# Patient Record
Sex: FEMALE | Race: OTHER | HISPANIC OR LATINO | URBAN - METROPOLITAN AREA
[De-identification: names, ages, dates, MRNs, and addresses within clinical notes are randomized per-mention and may not be internally consistent; named-entity substitution may affect disease eponyms.]

---

## 2018-01-27 ENCOUNTER — EMERGENCY (EMERGENCY)
Facility: HOSPITAL | Age: 83
LOS: 1 days | Discharge: HOME | End: 2018-01-27
Attending: EMERGENCY MEDICINE | Admitting: EMERGENCY MEDICINE
Payer: MEDICARE

## 2018-01-27 VITALS
HEART RATE: 65 BPM | DIASTOLIC BLOOD PRESSURE: 77 MMHG | SYSTOLIC BLOOD PRESSURE: 144 MMHG | TEMPERATURE: 98 F | OXYGEN SATURATION: 95 % | RESPIRATION RATE: 16 BRPM

## 2018-01-27 VITALS
SYSTOLIC BLOOD PRESSURE: 158 MMHG | HEART RATE: 60 BPM | DIASTOLIC BLOOD PRESSURE: 70 MMHG | RESPIRATION RATE: 17 BRPM | TEMPERATURE: 98 F | OXYGEN SATURATION: 97 %

## 2018-01-27 DIAGNOSIS — Z88.8 ALLERGY STATUS TO OTHER DRUGS, MEDICAMENTS AND BIOLOGICAL SUBSTANCES STATUS: ICD-10-CM

## 2018-01-27 DIAGNOSIS — M54.2 CERVICALGIA: ICD-10-CM

## 2018-01-27 DIAGNOSIS — Z88.1 ALLERGY STATUS TO OTHER ANTIBIOTIC AGENTS STATUS: ICD-10-CM

## 2018-01-27 DIAGNOSIS — Z79.2 LONG TERM (CURRENT) USE OF ANTIBIOTICS: ICD-10-CM

## 2018-01-27 DIAGNOSIS — I10 ESSENTIAL (PRIMARY) HYPERTENSION: ICD-10-CM

## 2018-01-27 DIAGNOSIS — E78.2 MIXED HYPERLIPIDEMIA: ICD-10-CM

## 2018-01-27 DIAGNOSIS — Z79.899 OTHER LONG TERM (CURRENT) DRUG THERAPY: ICD-10-CM

## 2018-01-27 DIAGNOSIS — R42 DIZZINESS AND GIDDINESS: ICD-10-CM

## 2018-01-27 DIAGNOSIS — E11.9 TYPE 2 DIABETES MELLITUS WITHOUT COMPLICATIONS: ICD-10-CM

## 2018-01-27 LAB
ALBUMIN SERPL ELPH-MCNC: 3.7 G/DL — SIGNIFICANT CHANGE UP (ref 3.3–5)
ALP SERPL-CCNC: 44 U/L — SIGNIFICANT CHANGE UP (ref 40–120)
ALT FLD-CCNC: 12 U/L — SIGNIFICANT CHANGE UP (ref 10–45)
ANION GAP SERPL CALC-SCNC: 11 MMOL/L — SIGNIFICANT CHANGE UP (ref 5–17)
APTT BLD: 31.7 SEC — SIGNIFICANT CHANGE UP (ref 27.5–37.4)
AST SERPL-CCNC: 18 U/L — SIGNIFICANT CHANGE UP (ref 10–40)
BASOPHILS NFR BLD AUTO: 0.5 % — SIGNIFICANT CHANGE UP (ref 0–2)
BILIRUB SERPL-MCNC: 0.4 MG/DL — SIGNIFICANT CHANGE UP (ref 0.2–1.2)
BUN SERPL-MCNC: 22 MG/DL — SIGNIFICANT CHANGE UP (ref 7–23)
CALCIUM SERPL-MCNC: 9.7 MG/DL — SIGNIFICANT CHANGE UP (ref 8.4–10.5)
CHLORIDE SERPL-SCNC: 100 MMOL/L — SIGNIFICANT CHANGE UP (ref 96–108)
CK MB CFR SERPL CALC: 2.6 NG/ML — SIGNIFICANT CHANGE UP (ref 0–6.7)
CK SERPL-CCNC: 52 U/L — SIGNIFICANT CHANGE UP (ref 25–170)
CO2 SERPL-SCNC: 26 MMOL/L — SIGNIFICANT CHANGE UP (ref 22–31)
CREAT SERPL-MCNC: 1 MG/DL — SIGNIFICANT CHANGE UP (ref 0.5–1.3)
EOSINOPHIL NFR BLD AUTO: 2.2 % — SIGNIFICANT CHANGE UP (ref 0–6)
GLUCOSE SERPL-MCNC: 104 MG/DL — HIGH (ref 70–99)
HCT VFR BLD CALC: 34.2 % — LOW (ref 34.5–45)
HGB BLD-MCNC: 11.2 G/DL — LOW (ref 11.5–15.5)
INR BLD: 1.17 — HIGH (ref 0.88–1.16)
LYMPHOCYTES # BLD AUTO: 17.9 % — SIGNIFICANT CHANGE UP (ref 13–44)
MCHC RBC-ENTMCNC: 28.1 PG — SIGNIFICANT CHANGE UP (ref 27–34)
MCHC RBC-ENTMCNC: 32.7 G/DL — SIGNIFICANT CHANGE UP (ref 32–36)
MCV RBC AUTO: 85.7 FL — SIGNIFICANT CHANGE UP (ref 80–100)
MONOCYTES NFR BLD AUTO: 9 % — SIGNIFICANT CHANGE UP (ref 2–14)
NEUTROPHILS NFR BLD AUTO: 70.4 % — SIGNIFICANT CHANGE UP (ref 43–77)
PLATELET # BLD AUTO: 207 K/UL — SIGNIFICANT CHANGE UP (ref 150–400)
POTASSIUM SERPL-MCNC: 4.2 MMOL/L — SIGNIFICANT CHANGE UP (ref 3.5–5.3)
POTASSIUM SERPL-SCNC: 4.2 MMOL/L — SIGNIFICANT CHANGE UP (ref 3.5–5.3)
PROT SERPL-MCNC: 6.5 G/DL — SIGNIFICANT CHANGE UP (ref 6–8.3)
PROTHROM AB SERPL-ACNC: 13 SEC — HIGH (ref 9.8–12.7)
RBC # BLD: 3.99 M/UL — SIGNIFICANT CHANGE UP (ref 3.8–5.2)
RBC # FLD: 14 % — SIGNIFICANT CHANGE UP (ref 10.3–16.9)
SODIUM SERPL-SCNC: 137 MMOL/L — SIGNIFICANT CHANGE UP (ref 135–145)
TROPONIN T SERPL-MCNC: <0.01 NG/ML — SIGNIFICANT CHANGE UP (ref 0–0.01)
WBC # BLD: 5.9 K/UL — SIGNIFICANT CHANGE UP (ref 3.8–10.5)
WBC # FLD AUTO: 5.9 K/UL — SIGNIFICANT CHANGE UP (ref 3.8–10.5)

## 2018-01-27 PROCEDURE — 72125 CT NECK SPINE W/O DYE: CPT | Mod: 26

## 2018-01-27 PROCEDURE — 99285 EMERGENCY DEPT VISIT HI MDM: CPT | Mod: 25

## 2018-01-27 PROCEDURE — 93010 ELECTROCARDIOGRAM REPORT: CPT

## 2018-01-27 PROCEDURE — 70450 CT HEAD/BRAIN W/O DYE: CPT | Mod: 26

## 2018-01-27 RX ORDER — ACETAMINOPHEN WITH CODEINE 300MG-30MG
1 TABLET ORAL
Qty: 18 | Refills: 0
Start: 2018-01-27

## 2018-01-27 RX ORDER — CYCLOBENZAPRINE HYDROCHLORIDE 10 MG/1
5 TABLET, FILM COATED ORAL ONCE
Refills: 0 | Status: COMPLETED | OUTPATIENT
Start: 2018-01-27 | End: 2018-01-27

## 2018-01-27 RX ORDER — CYCLOBENZAPRINE HYDROCHLORIDE 10 MG/1
1 TABLET, FILM COATED ORAL
Qty: 12 | Refills: 0
Start: 2018-01-27

## 2018-01-27 RX ORDER — ACETAMINOPHEN WITH CODEINE 300MG-30MG
1 TABLET ORAL ONCE
Refills: 0 | Status: DISCONTINUED | OUTPATIENT
Start: 2018-01-27 | End: 2018-01-27

## 2018-01-27 RX ORDER — SODIUM CHLORIDE 9 MG/ML
3 INJECTION INTRAMUSCULAR; INTRAVENOUS; SUBCUTANEOUS ONCE
Refills: 0 | Status: COMPLETED | OUTPATIENT
Start: 2018-01-27 | End: 2018-01-27

## 2018-01-27 RX ORDER — SODIUM CHLORIDE 9 MG/ML
1000 INJECTION INTRAMUSCULAR; INTRAVENOUS; SUBCUTANEOUS ONCE
Refills: 0 | Status: COMPLETED | OUTPATIENT
Start: 2018-01-27 | End: 2018-01-27

## 2018-01-27 RX ADMIN — SODIUM CHLORIDE 3 MILLILITER(S): 9 INJECTION INTRAMUSCULAR; INTRAVENOUS; SUBCUTANEOUS at 12:06

## 2018-01-27 RX ADMIN — Medication 1 TABLET(S): at 12:06

## 2018-01-27 RX ADMIN — SODIUM CHLORIDE 1000 MILLILITER(S): 9 INJECTION INTRAMUSCULAR; INTRAVENOUS; SUBCUTANEOUS at 12:06

## 2018-01-27 RX ADMIN — CYCLOBENZAPRINE HYDROCHLORIDE 5 MILLIGRAM(S): 10 TABLET, FILM COATED ORAL at 12:06

## 2018-01-27 NOTE — ED ADULT NURSE NOTE - OBJECTIVE STATEMENT
Pt states she's had L neck pain that radiates to her L arm and sometimes R shoulder for the past month. Pt denies injury. Pt states she's also been feeling dizzy when she sits up from the lying position recently. Pt states neck pain has become worse recently. Denies N/V, visual changes, head injury, SOB, chest pain, abdominal pain. Pt has hx of HTN, DM, hypercholesterol. Pt states she's been taking Tylenol with no relief. Pt seen today at [facility] and referred to ED. NAD. Spouse at bedside.

## 2018-01-27 NOTE — ED PROVIDER NOTE - NEUROLOGICAL, MLM
Alert and oriented, no focal deficits, no motor deficits. Walks with cane and gait noted without cane is slow but not ataxic.

## 2018-01-27 NOTE — ED PROVIDER NOTE - MUSCULOSKELETAL, MLM
no midline spine TTP, Pt with neck pain and "stiffness" when moving head from side to side > when moving to left side with paraspinal muscle spasms noted.

## 2018-01-27 NOTE — ED PROVIDER NOTE - PROGRESS NOTE DETAILS
post meds. Resting comfortably and requesting discharge. post meds. Resting comfortably and requesting discharge. Labs/ CTs noted.

## 2018-01-27 NOTE — ED PROVIDER NOTE - MEDICAL DECISION MAKING DETAILS
86 yo female with hx of HTN, HLD, DM, arthritis, denies heart problems or stroke, p/w L sided neck pain that radiates to her Left arm and sometimes to her R shoulder and arm for the past month. Pt denies any injury, trauma or falls. Pt also states she has been feeling dizzy when she sits up from the lying position over the past couple of weeks. Labs/ studies noted. CT Neck with Degenerative changes nit no acute fractures. Pt with marked improvement of sxs post Tylenol #3 and flexeril. Rxs given and pt to f/up outpt with PCP and Spine surgery.

## 2018-01-27 NOTE — ED PROVIDER NOTE - OBJECTIVE STATEMENT
86 yo female with hx of HTN, HLD, DM, arthritis, denies heart problems or stroke, p/w L sided neck pain that radiates to her Left arm and sometimes to her R shoulder and arm for the past month. Pt denies any injury, trauma or falls. Pt also states she has been feeling dizzy when she sits up from the lying position over the past couple of weeks. Pt states neck pain has become worse over the past several days and she has been taking Tylenol intermittently with minimal pain relief. No Tylenol use today. Is not taking any other pain meds.  Denies focal weakness, fevers, chills, HA, N/V, visual changes, head injury, SOB, chest pain, abdominal pain. No other complaints. 84 yo female with hx of HTN, HLD, DM, arthritis, denies heart problems or stroke, p/w L sided neck pain that radiates to her Left arm and sometimes to her R shoulder and arm for the past month. Pt denies any injury, trauma or falls. Pt also states she has been feeling dizzy when she sits up from the lying position over the past couple of weeks. No dizziness currently. Pt states neck pain has become worse over the past several days and she has been taking Tylenol intermittently with minimal pain relief. No Tylenol use today. Is not taking any other pain meds.  Denies focal weakness, fevers, chills, HA, N/V, visual changes, head injury, SOB, chest pain, abdominal pain. No other complaints.

## 2018-01-27 NOTE — ED ADULT NURSE NOTE - CHPI ED SYMPTOMS NEG
no weakness/no confusion/no fever/no loss of consciousness/no nausea/no blurred vision/no vomiting/no change in level of consciousness/no numbness

## 2018-01-27 NOTE — ED PROVIDER NOTE - CHIEF COMPLAINT
The patient is a 85y Female complaining of dizziness. The patient is a 85y Female complaining of neck pain.

## 2019-11-10 ENCOUNTER — EMERGENCY (EMERGENCY)
Facility: HOSPITAL | Age: 84
LOS: 1 days | Discharge: HOME | End: 2019-11-10
Attending: EMERGENCY MEDICINE | Admitting: EMERGENCY MEDICINE
Payer: MEDICARE

## 2019-11-10 VITALS
HEIGHT: 62 IN | WEIGHT: 132.06 LBS | TEMPERATURE: 98 F | SYSTOLIC BLOOD PRESSURE: 132 MMHG | OXYGEN SATURATION: 97 % | DIASTOLIC BLOOD PRESSURE: 75 MMHG | RESPIRATION RATE: 18 BRPM | HEART RATE: 79 BPM

## 2019-11-10 VITALS
RESPIRATION RATE: 18 BRPM | TEMPERATURE: 98 F | HEART RATE: 69 BPM | DIASTOLIC BLOOD PRESSURE: 72 MMHG | SYSTOLIC BLOOD PRESSURE: 121 MMHG | OXYGEN SATURATION: 98 %

## 2019-11-10 LAB
ANION GAP SERPL CALC-SCNC: 12 MMOL/L — SIGNIFICANT CHANGE UP (ref 5–17)
BASOPHILS # BLD AUTO: 0.02 K/UL — SIGNIFICANT CHANGE UP (ref 0–0.2)
BASOPHILS NFR BLD AUTO: 0.2 % — SIGNIFICANT CHANGE UP (ref 0–2)
BUN SERPL-MCNC: 23 MG/DL — SIGNIFICANT CHANGE UP (ref 7–23)
CALCIUM SERPL-MCNC: 9.7 MG/DL — SIGNIFICANT CHANGE UP (ref 8.4–10.5)
CHLORIDE SERPL-SCNC: 98 MMOL/L — SIGNIFICANT CHANGE UP (ref 96–108)
CO2 SERPL-SCNC: 26 MMOL/L — SIGNIFICANT CHANGE UP (ref 22–31)
CREAT SERPL-MCNC: 0.83 MG/DL — SIGNIFICANT CHANGE UP (ref 0.5–1.3)
CRP SERPL-MCNC: 4.54 MG/DL — HIGH (ref 0–0.4)
EOSINOPHIL # BLD AUTO: 0 K/UL — SIGNIFICANT CHANGE UP (ref 0–0.5)
EOSINOPHIL NFR BLD AUTO: 0 % — SIGNIFICANT CHANGE UP (ref 0–6)
ERYTHROCYTE [SEDIMENTATION RATE] IN BLOOD: 31 MM/HR — HIGH
GLUCOSE SERPL-MCNC: 227 MG/DL — HIGH (ref 70–99)
HCT VFR BLD CALC: 39.4 % — SIGNIFICANT CHANGE UP (ref 34.5–45)
HGB BLD-MCNC: 12.2 G/DL — SIGNIFICANT CHANGE UP (ref 11.5–15.5)
IMM GRANULOCYTES NFR BLD AUTO: 0.4 % — SIGNIFICANT CHANGE UP (ref 0–1.5)
LYMPHOCYTES # BLD AUTO: 0.7 K/UL — LOW (ref 1–3.3)
LYMPHOCYTES # BLD AUTO: 6.9 % — LOW (ref 13–44)
MCHC RBC-ENTMCNC: 27.1 PG — SIGNIFICANT CHANGE UP (ref 27–34)
MCHC RBC-ENTMCNC: 31 GM/DL — LOW (ref 32–36)
MCV RBC AUTO: 87.4 FL — SIGNIFICANT CHANGE UP (ref 80–100)
MONOCYTES # BLD AUTO: 1.28 K/UL — HIGH (ref 0–0.9)
MONOCYTES NFR BLD AUTO: 12.7 % — SIGNIFICANT CHANGE UP (ref 2–14)
NEUTROPHILS # BLD AUTO: 8.05 K/UL — HIGH (ref 1.8–7.4)
NEUTROPHILS NFR BLD AUTO: 79.8 % — HIGH (ref 43–77)
NRBC # BLD: 0 /100 WBCS — SIGNIFICANT CHANGE UP (ref 0–0)
PLATELET # BLD AUTO: 181 K/UL — SIGNIFICANT CHANGE UP (ref 150–400)
POTASSIUM SERPL-MCNC: SIGNIFICANT CHANGE UP MMOL/L (ref 3.5–5.3)
POTASSIUM SERPL-SCNC: SIGNIFICANT CHANGE UP MMOL/L (ref 3.5–5.3)
RBC # BLD: 4.51 M/UL — SIGNIFICANT CHANGE UP (ref 3.8–5.2)
RBC # FLD: 14.1 % — SIGNIFICANT CHANGE UP (ref 10.3–14.5)
SODIUM SERPL-SCNC: 136 MMOL/L — SIGNIFICANT CHANGE UP (ref 135–145)
WBC # BLD: 10.09 K/UL — SIGNIFICANT CHANGE UP (ref 3.8–10.5)
WBC # FLD AUTO: 10.09 K/UL — SIGNIFICANT CHANGE UP (ref 3.8–10.5)

## 2019-11-10 PROCEDURE — 93971 EXTREMITY STUDY: CPT

## 2019-11-10 PROCEDURE — 99284 EMERGENCY DEPT VISIT MOD MDM: CPT | Mod: 25

## 2019-11-10 PROCEDURE — 80048 BASIC METABOLIC PNL TOTAL CA: CPT

## 2019-11-10 PROCEDURE — 85025 COMPLETE CBC W/AUTO DIFF WBC: CPT

## 2019-11-10 PROCEDURE — 86140 C-REACTIVE PROTEIN: CPT

## 2019-11-10 PROCEDURE — 73630 X-RAY EXAM OF FOOT: CPT

## 2019-11-10 PROCEDURE — 73630 X-RAY EXAM OF FOOT: CPT | Mod: 26,LT

## 2019-11-10 PROCEDURE — 85652 RBC SED RATE AUTOMATED: CPT

## 2019-11-10 PROCEDURE — 93971 EXTREMITY STUDY: CPT | Mod: 26,LT

## 2019-11-10 PROCEDURE — 99284 EMERGENCY DEPT VISIT MOD MDM: CPT

## 2019-11-10 PROCEDURE — 36415 COLL VENOUS BLD VENIPUNCTURE: CPT

## 2019-11-10 RX ORDER — ACETAMINOPHEN 500 MG
650 TABLET ORAL ONCE
Refills: 0 | Status: COMPLETED | OUTPATIENT
Start: 2019-11-10 | End: 2019-11-10

## 2019-11-10 RX ADMIN — Medication 650 MILLIGRAM(S): at 15:01

## 2019-11-10 NOTE — ED PROVIDER NOTE - NSFOLLOWUPINSTRUCTIONS_ED_ALL_ED_FT
Take tylenol 2 pills every 6 hours as needed for pain.  Please see your primary care provider or referral given in 2-3 days.  Also see referral to podiatrist/ foot specialist.  Call for appointment.  If you have any problems with followup, please call the ED Referral Coordinator at [phone].  Prednisone may increased your blood sugar so check it at treat accordingly.  Return to the ER if symptoms worsen or other concerns.    Foot Pain  Many things can cause foot pain. Some common causes are:  An injury.A sprain.Arthritis.Blisters.Bunions.Follow these instructions at home:  Pay attention to any changes in your symptoms. Take these actions to help with your discomfort:  If directed, put ice on the affected area:  Put ice in a plastic bag.Place a towel between your skin and the bag.Leave the ice on for 15–20 minutes, 3?4 times a day for 2 days.Take over-the-counter and prescription medicines only as told by your health care provider.Wear comfortable, supportive shoes that fit you well. Do not wear high heels.Do not stand or walk for long periods of time.Do not lift a lot of weight. This can put added pressure on your feet.Do stretches to relieve foot pain and stiffness as told by your health care provider.Rub your foot gently.Keep your feet clean and dry.Contact a health care provider if:  Your pain does not get better after a few days of self-care.Your pain gets worse.You cannot stand on your foot.Get help right away if:  Your foot is numb or tingling.Your foot or toes are swollen.Your foot or toes turn white or blue.You have warmth and redness along your foot.This information is not intended to replace advice given to you by your health care provider. Make sure you discuss any questions you have with your health care provider.    Gota  Gout  Image   La gota es tyrone afección que causa la hinchazón dolorosa de las articulaciones. La gota es un tipo de inflamación de las articulaciones (artritis). Esta afección es causada por la acumulación de ácido úrico en el cuerpo. El ácido úrico es tryone sustancia química que se forma cuando el cuerpo descompone sustancias llamadas purinas. Las purinas son importantes para la fabricación de las proteínas del cuerpo.  Cuando el cuerpo tiene un exceso de ácido úrico, se pueden formar adrian con punta y acumularse en el interior de las articulaciones. [employer] [place] provoca dolor e hinchazón. Las crisis de gota pueden ocurrir rápidamente y ser muy dolorosas (gota aguda). Con el tiempo, las crisis pueden afectar más articulaciones y volverse más frecuentes (gota crónica). La gota también puede provocar la acumulación de ácido úrico debajo de la piel y en los riñones.  ¿Cuáles son las causas?  Esta afección es causada por la acumulación de ácido úrico en la shamika. [employer] [place] puede ocurrir debido a lo siguiente:  Los riñones no eliminan la cantidad suficiente de ácido úrico de la shamika. Esta es la causa más frecuente.El cuerpo produce demasiado ácido úrico. [employer] [place] puede ocurrir con algunos tipos de cáncer y tratamientos para el cáncer. También puede ocurrir si el cuerpo está descomponiendo demasiados glóbulos rojos (anemia hemolítica).Come demasiados alimentos ricos en purinas. Estos alimentos incluyen vísceras y algunos mariscos. Las bebidas alcohólicas, en especial la cerveza, también son ricas en purinas.Tyrone crisis de gota puede desencadenarse debido a un traumatismo o estrés.  ¿Qué incrementa el riesgo?  Es más probable que tenga esta afección si:  Tiene antecedentes familiares de gota.Es hombre de mediana edad.Es vandana que ha atravesado la menopausia.Tiene obesidad.Bear alcohol con frecuencia, en especial, cerveza.Está deshidratado.Pierde peso con demasiada rapidez.Le avery hecho un trasplante de un órgano.Se ha intoxicado con plomo.Usa determinados medicamentos, dudley aspirina, ciclosporina, diuréticos, levodopa y niacina.Tiene enfermedades renales.Tiene tyrone afección de la piel llamada psoriasis.¿Cuáles son los signos o los síntomas?  ImageUn ataque de gota aguda sucede de repente. Normalmente ocurre solo en tryone articulación. El lugar más común es el pulgar del pie. Las crisis a menudo comienzan por la noche. También pueden verse afectadas las articulaciones de los pies, los tobillos, las rodillas, los dedos de la mano, las muñecas o los codos. Los síntomas de esta afección pueden incluir los siguientes:  Dolor intenso.Calor.Hinchazón.Entumecimiento.Dolor a la palpación. Se puede sentir mucho dolor al tacto en la articulación afectada.Piel brillosa, sapna o morada.Fiebre y escalofríos.La gota crónica puede provocar síntomas con más frecuencia. Pueden verse involucradas más articulaciones. Es posible que también tenga bultos blancos o amarillos (tofos) en las ruy o los pies, o en otras zonas cercanas a las articulaciones.  ¿Cómo se diagnostica?  Esta afección se diagnostica en función de los síntomas, los antecedentes médicos y un examen físico. Pueden hacerle estudios, por ejemplo:  Análisis de shamika para medir los niveles de ácido úrico.Extracción de líquido sinovial con tyrone aguja delgada (aspiración) para buscar la presencia de adrian de ácido úrico.Radiografías para observar la articulación dañada.¿Cómo se trata?  El tratamiento para esta afección tiene dos fases: tratar un ataque pradip y prevenir ataques futuros. El tratamiento de la gota aguda puede incluir medicamentos para reducir el dolor y la hinchazón, por ejemplo:  Antiinflamatorios no esteroideos (MOHAN).Corticoesteroides. Estos son medicamentos antiinflamatorios rae que se pueden addison por vía oral por boca o se pueden inyectar en tyrone articulación.Colchicina. Julissa medicamento elinor el dolor y la hinchazón cuando se usa inmediatamente después de tyrone crisis. Puede administrarse por la boca o por tyrone vía intravenosa.El tratamiento preventivo puede incluir lo siguiente:  El uso diario de dosis más bajas de MOHAN o colchicina.El uso de un medicamento que reduce los niveles de ácido úrico en la shamika.Cambios en la dieta. Es posible que deba consultar a un nutricionista acerca de lo que debe comer y beber para evitar la gota.Siga estas indicaciones en bryant casa:  Angela tyrone crisis de gota     Image   Si se lo indican, aplique hielo sobre la samia afectada:  Ponga el hielo en tyrone bolsa plástica.Coloque tyrone toalla entre la piel y la bolsa.Coloque el hielo angela 20 minutos, 2 a 3 veces por día.Levante (eleve) la articulación afectada por encima del nivel del corazón tantas veces dudley le sea posible.Juana reposo todo el tiempo que pueda. Si la articulación afectada se encuentra en la pierna, quizá deba usar muletas.Siga las indicaciones del médico respecto de las restricciones en las comidas o las bebidas.Cómo evitar las crisis de gota en el futuro     Siga tyrone dieta baja en purinas dudley se lo haya indicado el nutricionista o el médico. Evite alimentos y bebidas con alto contenido de purinas, por ejemplo, hígado, riñón, anchoas, espárragos, arenque, hongos, mejillones y cerveza.Mantenga un peso saludable o pierda peso si tiene sobrepeso. Si quiere perder peso, hable con el médico. Es importante que no pierda peso demasiado rápido.Comience o siga un programa de actividad física dudley se lo haya indicado el médico.Comida y bebida     Latasha suficiente líquido para mantener la orina de color amarillo pálido.Si bear alcohol:  Limite la cantidad que bear a lo siguiente:  De 0 a 1 medida por día para las mujeres.De 0 a 2 medidas por día para los hombres.Esté atento a la cantidad de alcohol que hay en las bebidas que veda. En los [place], tyrone medida equivale a tyrone botella de cerveza de 12 oz (355 ml), un vaso de vino de 5 oz (148 ml) o un vaso de tyrone bebida alcohólica de sandeep graduación de 1½ oz (44 ml).Indicaciones generales     Verlot los medicamentos de venta kena y los recetados solamente dudley se lo haya indicado el médico.No conduzca ni use maquinaria pesada mientras veda analgésicos recetados.Retome nitin actividades normales según lo indicado por el médico. Pregúntele al médico qué actividades son seguras para usted.Concurra a todas las visitas de control dudley se lo haya indicado el médico. [place] es importante.Comuníquese con un médico si tiene:  Otra crisis de gota.Síntomas de tyrone crisis de gota que continúan después de 10 días de tratamiento.Efectos secundarios provocados por los medicamentos.Escalofríos o fiebre.Dolor urente al orinar.Dolor en la parte inferior de la espalda o en el vientre.Solicite ayuda inmediatamente si:  Siente dolor intenso o incontrolable.No puede orinar.Resumen  La gota es tyrone hinchazón dolorosa de las articulaciones causada por tyrone inflamación.El lugar más habitual donde se presenta el dolor es el dedo cholo del pie, dee también pueden verse afectadas otras articulaciones del cuerpo.Los medicamentos y los cambios en la dieta pueden ayudar a evitar y tratar las crisis de gota.Esta información no tiene dudley fin reemplazar el consejo del médico. Asegúrese de hacerle al médico cualquier pregunta que tenga.

## 2019-11-10 NOTE — ED PROVIDER NOTE - NSFOLLOWUPCLINICS_GEN_ALL_ED_FT
[facility]  Family Medicine  [address] [place] [address]  Phone: [phone]  Fax:   Follow Up Time:     [address]  [address] [place] [address] [place] [address]  Phone: [phone]  Fax: [phone]  Follow Up Time:

## 2019-11-10 NOTE — ED ADULT NURSE NOTE - NSIMPLEMENTINTERV_GEN_ALL_ED
Implemented All Universal Safety Interventions:  [place] to call system. Call bell, personal items and telephone within reach. Instruct patient to call for assistance. Room bathroom lighting operational. Non-slip footwear when patient is off stretcher. Physically safe environment: no spills, clutter or unnecessary equipment. Stretcher in lowest position, wheels locked, appropriate side rails in place.

## 2019-11-10 NOTE — ED PROVIDER NOTE - OBJECTIVE STATEMENT
here with pain and swelling in left foot.  Says it hurts to walk, pain radiates up leg.  Has had foot pain before and told it was gout but taking meds and not helping.   Denies trauma, fever, chills.  Increased with putting pressure on foot/walking, better with rest.

## 2019-11-10 NOTE — ED ADULT TRIAGE NOTE - OTHER COMPLAINTS
L foot pain worse since yesterday, states it is radiating up left leg. warm to touch. cap refill <2 sec distally. denies new injury/trauma. states she is having difficulty bearing weight due to pain. no cp/sob.

## 2019-11-10 NOTE — ED PROVIDER NOTE - PMH
Gout CHF (congestive heart failure)    Diabetes    Gout    High cholesterol    HTN (hypertension)    Paroxysmal A-fib

## 2019-11-10 NOTE — ED PROVIDER NOTE - CARE PROVIDER_API CALL
Francis Martinez (JINNYM)  Podiatric Medicine and Surgery  [address], [address]  [place] [address]  Phone: [phone]  Fax: [phone]  Follow Up Time:

## 2019-11-10 NOTE — ED PROVIDER NOTE - CLINICAL SUMMARY MEDICAL DECISION MAKING FREE TEXT BOX
left foot pain/ swelling.  tender to touch plantar surface.  no focal redness/warmth/fever/leukocytosis to suggest infection.  duplex neg for dvt.  xray neg for fracture.  good pedal pulse.  per family, has been told it was gout in past.  tried colchicine last month without relief.  on coumadin/has priro gastritis limiting use of nsaids.  will trial course of prednisone.  understands may increase glucose with dm.  refer to podiatry.  family requesting change of pmd to [facility].  will refer to clinic.  return precautions discussed

## 2019-11-10 NOTE — ED PROVIDER NOTE - MUSCULOSKELETAL, MLM
left foot with diffuse swelling, mild erythema, no warmth.  callous between toes 2-3, 3-4 without open wound.  tenderness plantar surface.  pedal pulse 2+.  able to range toes without increased pain.

## 2019-11-10 NOTE — ED PROVIDER NOTE - PATIENT PORTAL LINK FT
You can access the FollowMyHealth Patient Portal offered by [facility] by registering at the following website: [email or web address]. By joining [facility] [employer]’s FollowMyHealth portal, you will also be able to view your health information using other applications (apps) compatible with our system.

## 2019-11-15 DIAGNOSIS — M79.672 PAIN IN LEFT FOOT: ICD-10-CM

## 2025-01-22 NOTE — ED PROVIDER NOTE - CARDIOVASCULAR NEGATIVE STATEMENT, MLM
Notes prior H/O COVID - HAS HAD prior COVID vaccines/no fever/no chills/no sweating
normal rate and rhythm, no chest pain and no edema.